# Patient Record
Sex: MALE | Race: WHITE | NOT HISPANIC OR LATINO | Employment: FULL TIME | URBAN - METROPOLITAN AREA
[De-identification: names, ages, dates, MRNs, and addresses within clinical notes are randomized per-mention and may not be internally consistent; named-entity substitution may affect disease eponyms.]

---

## 2018-10-08 ENCOUNTER — APPOINTMENT (OUTPATIENT)
Dept: URGENT CARE | Facility: MEDICAL CENTER | Age: 38
End: 2018-10-08

## 2018-10-15 ENCOUNTER — APPOINTMENT (OUTPATIENT)
Dept: URGENT CARE | Facility: MEDICAL CENTER | Age: 38
End: 2018-10-15
Payer: OTHER MISCELLANEOUS

## 2018-10-15 PROCEDURE — 99213 OFFICE O/P EST LOW 20 MIN: CPT | Performed by: PREVENTIVE MEDICINE

## 2018-10-19 ENCOUNTER — APPOINTMENT (OUTPATIENT)
Dept: URGENT CARE | Facility: MEDICAL CENTER | Age: 38
End: 2018-10-19
Payer: OTHER MISCELLANEOUS

## 2018-10-19 PROCEDURE — 99213 OFFICE O/P EST LOW 20 MIN: CPT | Performed by: FAMILY MEDICINE

## 2018-10-26 ENCOUNTER — APPOINTMENT (OUTPATIENT)
Dept: URGENT CARE | Facility: MEDICAL CENTER | Age: 38
End: 2018-10-26
Payer: OTHER MISCELLANEOUS

## 2018-10-26 PROCEDURE — 99213 OFFICE O/P EST LOW 20 MIN: CPT | Performed by: PREVENTIVE MEDICINE

## 2018-11-02 ENCOUNTER — APPOINTMENT (OUTPATIENT)
Dept: OCCUPATIONAL MEDICINE | Facility: CLINIC | Age: 38
End: 2018-11-02
Payer: OTHER MISCELLANEOUS

## 2018-11-02 PROCEDURE — 99213 OFFICE O/P EST LOW 20 MIN: CPT | Performed by: PREVENTIVE MEDICINE

## 2018-11-05 ENCOUNTER — TRANSCRIBE ORDERS (OUTPATIENT)
Dept: ADMINISTRATIVE | Facility: HOSPITAL | Age: 38
End: 2018-11-05

## 2018-11-05 DIAGNOSIS — M54.5 LOW BACK PAIN, UNSPECIFIED BACK PAIN LATERALITY, UNSPECIFIED CHRONICITY, WITH SCIATICA PRESENCE UNSPECIFIED: Primary | ICD-10-CM

## 2018-11-08 ENCOUNTER — EVALUATION (OUTPATIENT)
Dept: PHYSICAL THERAPY | Facility: MEDICAL CENTER | Age: 38
End: 2018-11-08
Payer: OTHER MISCELLANEOUS

## 2018-11-08 DIAGNOSIS — M54.50 ACUTE BILATERAL LOW BACK PAIN WITHOUT SCIATICA: Primary | ICD-10-CM

## 2018-11-08 DIAGNOSIS — M54.6 ACUTE MIDLINE THORACIC BACK PAIN: ICD-10-CM

## 2018-11-08 PROCEDURE — G8991 OTHER PT/OT GOAL STATUS: HCPCS | Performed by: PHYSICAL THERAPIST

## 2018-11-08 PROCEDURE — 97110 THERAPEUTIC EXERCISES: CPT | Performed by: PHYSICAL THERAPIST

## 2018-11-08 PROCEDURE — G8990 OTHER PT/OT CURRENT STATUS: HCPCS | Performed by: PHYSICAL THERAPIST

## 2018-11-08 PROCEDURE — 97162 PT EVAL MOD COMPLEX 30 MIN: CPT | Performed by: PHYSICAL THERAPIST

## 2018-11-08 PROCEDURE — 97014 ELECTRIC STIMULATION THERAPY: CPT | Performed by: PHYSICAL THERAPIST

## 2018-11-08 RX ORDER — METHOCARBAMOL 750 MG/1
500 TABLET, FILM COATED ORAL EVERY 6 HOURS PRN
COMMUNITY
End: 2018-11-17
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
## 2018-11-12 ENCOUNTER — OFFICE VISIT (OUTPATIENT)
Dept: PHYSICAL THERAPY | Facility: MEDICAL CENTER | Age: 38
End: 2018-11-12
Payer: OTHER MISCELLANEOUS

## 2018-11-12 DIAGNOSIS — M54.6 ACUTE MIDLINE THORACIC BACK PAIN: ICD-10-CM

## 2018-11-12 DIAGNOSIS — M54.50 ACUTE BILATERAL LOW BACK PAIN WITHOUT SCIATICA: Primary | ICD-10-CM

## 2018-11-12 PROCEDURE — 97140 MANUAL THERAPY 1/> REGIONS: CPT | Performed by: PHYSICAL THERAPIST

## 2018-11-12 PROCEDURE — 97014 ELECTRIC STIMULATION THERAPY: CPT | Performed by: PHYSICAL THERAPIST

## 2018-11-12 PROCEDURE — 97110 THERAPEUTIC EXERCISES: CPT | Performed by: PHYSICAL THERAPIST

## 2018-11-12 NOTE — PROGRESS NOTES
Daily Note     Today's date: 2018  Patient name: Avery Choudhury  : 1980  MRN: [id number]  Referring provider: Jaimie Lorenzo MD  Dx:   Encounter Diagnosis     ICD-10-CM    1  Acute bilateral low back pain without sciatica M54 5    2  Acute midline thoracic back pain M54 6        Start Time: 0800  Stop Time: 0852  Total time in clinic (min): 52 minutes    Subjective: Patient reports that he did well with his HEP He reports that he was able to improve the ROM of his lower trunk rotations over the weekend  Patient reports that he had a few hours of total relief following TENS and ice last visit  Objective: See treatment diary below  Precautions: As Tolerated     Daily Treatment Diary      Manual                     Lumbar STM NV  TK 11 min                                                                                                                         Exercise Diary                     PPT 5 sec x 10 5 sec x 15                   Singe knee to chest 3 sec x 10 bilaterally  3 sec x 10 bilaterally                   LTR 10x pain free range  10x pain free range                    DLS: Ball Press   15 x 5 sec hold                    Bridges   2 x 10                    DLS: Marche   NV                    Total gym squat    NV                    Clam shells   NV                    tband rows with ab bracing   NV                    tband extensions with ab vracing   NV                                                                                                                                                                                                                                                                         Modalities                     CP L/S 10' TK  10' TK                   Interferential stim 10'right lumbar spine  10'right lumbar spine                                                  Assessment:  Patient tolerated treatment session well this visit   He had improved tolerance to prone lying as he was able to lie prone with two pillows under hips  Patient also did not report any tenderness to palpation of paraspinals during manual STM this visit  Patient challenged with abdominal activation activities this date as he reported soreness and fatigue in anterior trunk following exercise program  He will continue to benefit from PT in order to decrease lumbar spine pain and facilitate return to function and work activities  Plan: Continue per plan of care

## 2018-11-13 ENCOUNTER — HOSPITAL ENCOUNTER (OUTPATIENT)
Dept: RADIOLOGY | Age: 38
Discharge: HOME | End: 2018-11-13
Payer: OTHER MISCELLANEOUS

## 2018-11-13 DIAGNOSIS — M54.5 LOW BACK PAIN, UNSPECIFIED BACK PAIN LATERALITY, UNSPECIFIED CHRONICITY, WITH SCIATICA PRESENCE UNSPECIFIED: ICD-10-CM

## 2018-11-13 PROCEDURE — 72148 MRI LUMBAR SPINE W/O DYE: CPT

## 2018-11-13 PROCEDURE — 72146 MRI CHEST SPINE W/O DYE: CPT

## 2018-11-15 ENCOUNTER — OFFICE VISIT (OUTPATIENT)
Dept: PHYSICAL THERAPY | Facility: MEDICAL CENTER | Age: 38
End: 2018-11-15
Payer: OTHER MISCELLANEOUS

## 2018-11-15 DIAGNOSIS — M54.50 ACUTE BILATERAL LOW BACK PAIN WITHOUT SCIATICA: Primary | ICD-10-CM

## 2018-11-15 DIAGNOSIS — M54.6 ACUTE MIDLINE THORACIC BACK PAIN: ICD-10-CM

## 2018-11-15 PROCEDURE — 97110 THERAPEUTIC EXERCISES: CPT | Performed by: PHYSICAL THERAPIST

## 2018-11-15 PROCEDURE — 97014 ELECTRIC STIMULATION THERAPY: CPT | Performed by: PHYSICAL THERAPIST

## 2018-11-15 PROCEDURE — 97140 MANUAL THERAPY 1/> REGIONS: CPT | Performed by: PHYSICAL THERAPIST

## 2018-11-15 NOTE — PROGRESS NOTES
Daily Note     Today's date: 11/15/2018  Patient name: Elis Escobedo  : 1980  MRN: [id number]  Referring provider: Bo Douglas MD  Dx:   Encounter Diagnosis     ICD-10-CM    1  Acute bilateral low back pain without sciatica M54 5    2  Acute midline thoracic back pain M54 6        Start Time: 0800  Stop Time: 0903  Total time in clinic (min): 63 minutes    Subjective: Patient states that he was stiff after receiving his MRI  He states that overall his back pain is improving and he is moving better   He reports that he has follow up appointment with referring physician tomorrow (18)      Objective: See treatment diary below  Precautions: As Tolerated     Daily Treatment Diary      Manual  11/8  11/12  11/15                 Lumbar STM NV  TK 11 min  TK 9 min                                                                                                                       Exercise Diary                   PPT 5 sec x 10 5 sec x 15  5 sec x 15                 Singe knee to chest 3 sec x 10 bilaterally  3 sec x 10 bilaterally  3 sec x 10 bilaterally                 LTR 10x pain free range  10x pain free range 10x pain free range                  DLS: Ball Press   15 x 5 sec hold  15 x 5 sec hold                  Bridges   2 x 10  2 x 10                  DLS: Marche   NV  29X                  Total gym squat    NV  Level 17 2 x 10                  Clam shells   NV  NV                  tband rows with ab bracing   NV  2 x 10 RTB                  tband extensions with ab vracing   NV  2 x 10 RTB                                                                                                                                                                                                                                                                       Modalities  11/8  11/12  11/15                 CP L/S 10' TK  10' TK  10' TK                 Interferential stim 10'right lumbar spine  10'right lumbar spine   10'right lumbar spine                                               Assessment: Patient tolerated treatment session well  Improved abdominal activation during lumbar stabilization activities this date  Patient also had a good tolerance to performing and maintain core activation during UE use this date  E will continue to benefit from PT in order to improve core strength, decrease lumbar pain, and allow for safe return to to work  Plan: Continue per plan of care

## 2018-11-16 ENCOUNTER — APPOINTMENT (OUTPATIENT)
Dept: OCCUPATIONAL MEDICINE | Facility: CLINIC | Age: 38
End: 2018-11-16
Payer: OTHER MISCELLANEOUS

## 2018-11-16 PROCEDURE — 99213 OFFICE O/P EST LOW 20 MIN: CPT

## 2018-11-17 VITALS
SYSTOLIC BLOOD PRESSURE: 148 MMHG | BODY MASS INDEX: 28.18 KG/M2 | HEIGHT: 70 IN | HEART RATE: 85 BPM | DIASTOLIC BLOOD PRESSURE: 104 MMHG | WEIGHT: 196.8 LBS

## 2018-11-17 DIAGNOSIS — M54.6 CHRONIC THORACIC BACK PAIN, UNSPECIFIED BACK PAIN LATERALITY: Primary | ICD-10-CM

## 2018-11-17 DIAGNOSIS — G89.29 CHRONIC THORACIC BACK PAIN, UNSPECIFIED BACK PAIN LATERALITY: Primary | ICD-10-CM

## 2018-11-17 DIAGNOSIS — M54.50 ACUTE BILATERAL LOW BACK PAIN WITHOUT SCIATICA: ICD-10-CM

## 2018-11-17 PROCEDURE — 99203 OFFICE O/P NEW LOW 30 MIN: CPT | Performed by: FAMILY MEDICINE

## 2018-11-17 RX ORDER — IBUPROFEN 800 MG/1
TABLET ORAL EVERY 6 HOURS PRN
COMMUNITY
End: 2018-12-05 | Stop reason: SDUPTHER
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
## 2018-11-19 ENCOUNTER — OFFICE VISIT (OUTPATIENT)
Dept: PHYSICAL THERAPY | Facility: MEDICAL CENTER | Age: 38
End: 2018-11-19
Payer: OTHER MISCELLANEOUS

## 2018-11-19 ENCOUNTER — TELEPHONE (OUTPATIENT)
Dept: PAIN MEDICINE | Facility: MEDICAL CENTER | Age: 38
End: 2018-11-19

## 2018-11-19 ENCOUNTER — NURSE TRIAGE (OUTPATIENT)
Dept: PHYSICAL THERAPY | Facility: OTHER | Age: 38
End: 2018-11-19

## 2018-11-19 DIAGNOSIS — M54.6 ACUTE MIDLINE THORACIC BACK PAIN: ICD-10-CM

## 2018-11-19 DIAGNOSIS — M54.50 ACUTE BILATERAL LOW BACK PAIN WITHOUT SCIATICA: Primary | ICD-10-CM

## 2018-11-19 PROCEDURE — 97110 THERAPEUTIC EXERCISES: CPT | Performed by: PHYSICAL THERAPIST

## 2018-11-19 PROCEDURE — 97140 MANUAL THERAPY 1/> REGIONS: CPT | Performed by: PHYSICAL THERAPIST

## 2018-11-19 PROCEDURE — 97014 ELECTRIC STIMULATION THERAPY: CPT | Performed by: PHYSICAL THERAPIST

## 2018-11-19 NOTE — TELEPHONE ENCOUNTER
Pt being referred for low back pain    No prior pain mgt  MRI done @   W/C info below          Nithin 34- 10/5/2018  [address] [facility]  Address: [facility] [address] [address]  Claim: [id number]  Adjustor: Alexys Allen  Phone # [phone]      W/C active and open
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
## 2018-11-19 NOTE — TELEPHONE ENCOUNTER
Pt states this is a work comp case  Pt is actively working with his [facility] and [employer] regarding appts       RN made pt aware that since this is workman's comp, there is no eligibility for direct access, nor could any referrals come from this program

## 2018-11-20 NOTE — TELEPHONE ENCOUNTER
Reason for Disposition   Is this related to a work injury?     Protocols used: SUZANNE MALL[facility] COMPREHENSIVE SPINE PROGRAM PROTOCOL

## 2018-11-21 ENCOUNTER — OFFICE VISIT (OUTPATIENT)
Dept: PHYSICAL THERAPY | Facility: MEDICAL CENTER | Age: 38
End: 2018-11-21
Payer: OTHER MISCELLANEOUS

## 2018-11-21 DIAGNOSIS — M54.50 ACUTE BILATERAL LOW BACK PAIN WITHOUT SCIATICA: Primary | ICD-10-CM

## 2018-11-21 DIAGNOSIS — M54.6 ACUTE MIDLINE THORACIC BACK PAIN: ICD-10-CM

## 2018-11-21 PROCEDURE — 97014 ELECTRIC STIMULATION THERAPY: CPT

## 2018-11-21 PROCEDURE — 97110 THERAPEUTIC EXERCISES: CPT

## 2018-11-21 PROCEDURE — 97140 MANUAL THERAPY 1/> REGIONS: CPT

## 2018-11-21 NOTE — PROGRESS NOTES
Daily Note     Today's date: 2018  Patient name: Sloan Naylor  : 1980  MRN: [id number]  Referring provider: Beth Perez MD  Dx:   Encounter Diagnosis     ICD-10-CM    1  Acute bilateral low back pain without sciatica M54 5    2  Acute midline thoracic back pain M54 6                8:30- 9:30   Subjective: Pt states he continues to have pain along the right side of his low back and in toward the middle of his low back  Pt states he has been doing his exercise at home which seem to help decrease pain       Objective: See treatment diary below  Precautions: T11 compression Fx     Daily Treatment Diary      Manual  11/8  11/12  11/15  11/19  11/21             Lumbar STM NV  TK 11 min  TK 9 min  TK 10 min    CF10                                                                                                                   Exercise Diary                Ab bracing 5 sec x 10 5 sec x 15  5 sec x 15  5 sec x 15  15 x 5"              Singe knee to chest 3 sec x 10 bilaterally  3 sec x 10 bilaterally  3 sec x 10 bilaterally  NP  HEP             LTR 10x pain free range  10x pain free range 10x pain free range  NP  10x               DLS: Ball Press   15 x 5 sec hold  15 x 5 sec hold  15 x 5 sec hold 15 x5"               Bridges   2 x 10  2 x 10   2 x 10  2 x10               DLS: Marche   NV  61X   10x  10x              Total gym squat    NV  Level 17 2 x 10  Level 17 2 x 10  lv 17              Clam shells   NV  NV 20x 20x              tband rows with ab bracing   NV  2 x 10 RTB  2 x 10 RTB  2 x10   RTB              tband extensions with ab vracing   NV  2 x 10 RTB  2 x 10 RTB  2 x10 RTB                                                                                                                                                                                                                                                                   Modalities  11/8  11/12  11/15  11/19  11/21             CP L/S 10' TK  10' TK  10' TK  10' TK  10' CF             Interferential stim 10'right lumbar spine  10'right lumbar spine   10'right lumbar spine  10'right lumbar spine  10' R l-spine                                           Assessment: Pt progressed through exercises well with good core contraction seen with table exercises  Pt demonstrates fair  Scapular control seen with tband exercises, pt was able to self correct after verbal cuing  Pt demonstrates decreased pain at end of session  Plan: Continue per plan of care

## 2018-11-26 ENCOUNTER — OFFICE VISIT (OUTPATIENT)
Dept: PHYSICAL THERAPY | Facility: MEDICAL CENTER | Age: 38
End: 2018-11-26
Payer: OTHER MISCELLANEOUS

## 2018-11-26 DIAGNOSIS — M54.50 ACUTE BILATERAL LOW BACK PAIN WITHOUT SCIATICA: Primary | ICD-10-CM

## 2018-11-26 DIAGNOSIS — M54.6 ACUTE MIDLINE THORACIC BACK PAIN: ICD-10-CM

## 2018-11-26 PROCEDURE — 97112 NEUROMUSCULAR REEDUCATION: CPT | Performed by: PHYSICAL THERAPIST

## 2018-11-26 PROCEDURE — 97014 ELECTRIC STIMULATION THERAPY: CPT | Performed by: PHYSICAL THERAPIST

## 2018-11-26 PROCEDURE — 97140 MANUAL THERAPY 1/> REGIONS: CPT | Performed by: PHYSICAL THERAPIST

## 2018-11-26 NOTE — PROGRESS NOTES
Daily Note     Today's date: 2018  Patient name: Kenn Daley  : 1980  MRN: [id number]  Referring provider: Brittany Zapata MD  Dx:   Encounter Diagnosis     ICD-10-CM    1  Acute bilateral low back pain without sciatica M54 5    2  Acute midline thoracic back pain M54 6        Start Time: 0800  Stop Time: 0903  Total time in clinic (min): 63 minutes    Subjective: Patient reports pain level of 5/10 in lower thoracic/upper lumbar spine this date  He states that the pain is not exacerbated by any activity  Patient reports that his pain is now central in nature and does not radiate to right side like is used to  He states that the exercises and message have helped to reduce the muscle pain in his back        Objective: See treatment diary below  Precautions: T11 compression Fx     Daily Treatment Diary      Manual  11/8  11/12  11/15  11/19  11/21  11/26           Lumbar STM NV  TK 11 min  TK 9 min  TK 10 min    CF10  TK 10 min                                                                                                                 Exercise Diary              Ab bracing 5 sec x 10 5 sec x 15  5 sec x 15  5 sec x 15  15 x 5"   15 x 5"             DLS: Ball Press   15 x 5 sec hold  15 x 5 sec hold  15 x 5 sec hold 15 x5"   15 x5"            Bridges   2 x 10  2 x 10   2 x 10  2 x10   2 x10            DLS: Marche   NV  19Z   10x  10x   10x            Total gym squat    NV  Level 17 2 x 10  Level 17 2 x 10  lv 17  Level 17 2 x 10            Clam shells   NV  NV 20x 20x  20x RTBB             tband rows with ab bracing   NV  2 x 10 RTB  2 x 10 RTB  2 x10   RTB   2 x10   GTB            tband extensions with ab vracing   NV  2 x 10 RTB  2 x 10 RTB  2 x10 RTB    2 x10   GTB            Pallof Press             2 x10   GTB                                                                                                                                                                                                                                         Modalities  11/8  11/12  11/15  11/19  11/21  11/26           CP L/S 10' TK  10' TK  10' TK  10' TK  10' CF  10' TK           Interferential stim 10'right lumbar spine  10'right lumbar spine   10'right lumbar spine  10'right lumbar spine  10' R l-spine   10'right lumbar spine                                          Assessment: Patient tolerated addition of resistance to gluteal and scapular exercises well without complaints of pain  He also tolerated addition of pallof press for abdominal activation/strength without reported exacerbation of symptoms  Patient will continue to benefit from PT in order to decreas lower thoracic spine pain and continue to increase tolerance to activity  Plan: Continue per plan of care

## 2018-11-29 ENCOUNTER — OFFICE VISIT (OUTPATIENT)
Dept: PHYSICAL THERAPY | Facility: MEDICAL CENTER | Age: 38
End: 2018-11-29
Payer: OTHER MISCELLANEOUS

## 2018-11-29 DIAGNOSIS — M54.6 ACUTE MIDLINE THORACIC BACK PAIN: ICD-10-CM

## 2018-11-29 DIAGNOSIS — M54.50 ACUTE BILATERAL LOW BACK PAIN WITHOUT SCIATICA: Primary | ICD-10-CM

## 2018-11-29 PROCEDURE — 97112 NEUROMUSCULAR REEDUCATION: CPT | Performed by: PHYSICAL THERAPIST

## 2018-11-29 PROCEDURE — 97014 ELECTRIC STIMULATION THERAPY: CPT | Performed by: PHYSICAL THERAPIST

## 2018-11-29 PROCEDURE — 97140 MANUAL THERAPY 1/> REGIONS: CPT | Performed by: PHYSICAL THERAPIST

## 2018-11-29 NOTE — PROGRESS NOTES
Daily Note     Today's date: 2018  Patient name: Frederick Baker  : 1980  MRN: [id number]  Referring provider: Mikey Middleton MD  Dx:   Encounter Diagnosis     ICD-10-CM    1  Acute bilateral low back pain without sciatica M54 5    2  Acute midline thoracic back pain M54 6        Start Time: 0800  Stop Time: 0907  Total time in clinic (min): 67 minutes    Subjective: Patient states that he continues to have pain of 5/10 in his lower thoracic spine  He reports that this has not been radiating to the right as it once did  Patient reports that he also has noticed improved tolerance to activity without exacerbation of symptoms        Ont diary belowbjective: See treatment diary below  Precautions: T11 compression Fx     Daily Treatment Diary      Manual  11/8  11/12  11/15  11/19  11/21  11/26  11/28         Lumbar STM NV  TK 11 min  TK 9 min  TK 10 min    CF10  TK 10 min  TK 10 min                                                                                                               Exercise Diary           Ab bracing 5 sec x 10 5 sec x 15  5 sec x 15  5 sec x 15  15 x 5"   15 x 5"   15 x 5"           DLS: Ball Press   15 x 5 sec hold  15 x 5 sec hold  15 x 5 sec hold 15 x5"   15 x5"  15 x 5"           Bridges   2 x 10  2 x 10   2 x 10  2 x10   2 x10  2 x10          DLS: Marche   NV  12O   10x  10x   10x   10x          Total gym squat    NV  Level 17 2 x 10  Level 17 2 x 10  lv 17  Level 17 2 x 10   Level 17 2 x 10          Clam shells   NV  NV 20x 20x  20x RTBB   20x RTBB          tband rows with ab bracing   NV  2 x 10 RTB  2 x 10 RTB  2 x10   RTB   2 x10   GTB    2 x10   GTB          tband extensions with ab vracing   NV  2 x 10 RTB  2 x 10 RTB  2 x10 RTB    2 x10   GTB   2 x10   GTB          Pallof Press             2 x10   GTB   2 x10   GTB                                                                                                                                                                                                                                       Modalities  11/8  11/12  11/15  11/19  11/21  11/26  11/28         CP L/S 10' TK  10' TK  10' TK  10' TK  10' CF  10' TK  10' TK         Interferential stim 10'right lumbar spine  10'right lumbar spine   10'right lumbar spine  10'right lumbar spine  10' R l-spine   10'right lumbar spine   10'right lumbar spine                                        Assessment:  Patient reports no change in pain following STM this date  He is able to perform scapular and abdominal exercises without increase in pain  He will continue to benefit from PT in order to improve tolerance to functional activity  Plan: Continue per plan of care

## 2018-12-03 ENCOUNTER — OFFICE VISIT (OUTPATIENT)
Dept: PHYSICAL THERAPY | Facility: MEDICAL CENTER | Age: 38
End: 2018-12-03
Payer: OTHER MISCELLANEOUS

## 2018-12-03 DIAGNOSIS — M54.6 ACUTE MIDLINE THORACIC BACK PAIN: ICD-10-CM

## 2018-12-03 DIAGNOSIS — M54.50 ACUTE BILATERAL LOW BACK PAIN WITHOUT SCIATICA: Primary | ICD-10-CM

## 2018-12-03 PROCEDURE — 97112 NEUROMUSCULAR REEDUCATION: CPT | Performed by: PHYSICAL THERAPIST

## 2018-12-03 PROCEDURE — 97014 ELECTRIC STIMULATION THERAPY: CPT | Performed by: PHYSICAL THERAPIST

## 2018-12-03 PROCEDURE — 97140 MANUAL THERAPY 1/> REGIONS: CPT | Performed by: PHYSICAL THERAPIST

## 2018-12-03 NOTE — PROGRESS NOTES
PT Re-Evaluation     Today's date: 12/3/2018  Patient name: Elis Escobedo  : 1980  MRN: [id number]  Referring provider: Bo Douglas MD  Dx:   Encounter Diagnosis     ICD-10-CM    1  Acute bilateral low back pain without sciatica M54 5    2  Acute midline thoracic back pain M54 6                   Assessment  Assessment details: Patient has demonstrated improved volitional abdominal control and decrease paraspinal hypertonicity allowing for an overall decrease in pain  This has allowed for improved tolerance to sitting, tanding, and ambulation  patient continues to have pain without prolonged activity  He will continue to benefit from PT in order to decrease lumbar/thoracic pain, improve core strength, and improve tolerance to activity        Impairments: abnormal or restricted ROM, activity intolerance, lacks appropriate home exercise program and pain with function  Understanding of Dx/Px/POC: good   Prognosis: fair    Goals  Short term goals:   1) Patient will demonstrate decrease pain by 20-50% in 4 weeks-ongoing  2) Patient will demonstrate no raheel's sign when returning from lumbar/trunk flexion  in 4 weeksongoing  3) Patient will demonstrate improved lumbar ROM by 25% in 4 weeks-ongoing  4) Patient will demonstrate TA contraction of good in 4 week-ongoing  Long term goals:  1) Patient will demonstrate ability to ambulate > 2 hours without pain in 6 weeks-ongoing  2) Patient will demonstrate ability to sit from > 1 hours without lumbar pain in 6 wee-ongoingks  3) Patient will demonstrate ability to carry laundry without lumbar pain in 6 weeks-ongoing  4) Patient will demonstrate independence with HEP in  6 weeks-ongoing          Plan  Patient would benefit from: skilled physical therapy  Referral necessary: No  Planned modality interventions: electrical stimulation/Russian stimulation, TENS, thermotherapy: hydrocollator packs, cryotherapy and unattended electrical stimulation  Planned therapy interventions: joint mobilization, manual therapy, massage, motor coordination training, muscle pump exercises, neuromuscular re-education, patient education, postural training, strengthening, stretching, therapeutic activities, therapeutic exercise, therapeutic training, graded motor, graded exercise, graded activity, gait training, functional ROM exercises, flexibility, activity modification, abdominal trunk stabilization and body mechanics training  Frequency: 2x week  Duration in weeks: 6  Treatment plan discussed with: patient        Subjective Evaluation    History of Present Illness  Mechanism of injury: Patient reports that he has noticed an improvement since starting physical therapy  He has noticed increased ease with moving, however states that he continues to avoid bending of his trunk and twisting of his trunk  Patient reports that he was able to sit for an hour and a half prior to increased pain  Patient reports that overall he has noticed a decrease in pain  Patient denies numbness and tingling into legs  Patient has appointment with pain management physician on 2018  He reports that he reports that he has returned to 60% prior level of functioning  Pain  Current pain ratin  At best pain ratin  At worst pain ratin  Location: P1) Lower thoracic to right lumbar spine- constant, nonvarying, deep " burning"    Social Support  Lives in: multiple-level home  Lives with: spouse and young children    Employment status: working (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)  Exercise history: Patient 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[employer], carrying laundry down stairs  Patient reports that he likes to ride his motor cycle          Objective     Special Questions  Positive for history of trauma  Negative for bladder dysfunction, bowel dysfunction, saddle (S4) numbness and history of cancer    Additional Special Questions  Patient denies unexplained weight loss       Static Posture Comments  Increase paraspinal muscle tone    Palpation   Left   No palpable tenderness to the lumbar paraspinals  Right   No palpable tenderness to the lumbar paraspinals and quadratus lumborum  Active Range of Motion     Lumbar   Flexion: Active lumbar flexion: 75% pain coming back up, raheel's sign:    Extension: Active lumbar extension: 25% Pain    Left lateral flexion: Active left lumbar lateral flexion: 75% NE  Right lateral flexion: Active right lumbar lateral flexion: 50% pain on right side    Left rotation: Active left lumbar rotation: 75% NE  Right rotation: Active right lumbar rotation: 50%      Additional Active Range of Motion Details  (+) raheel's sign returning from flexion  (+) Prone instability test L3  SLR 93 right  SLR 91 left    Muscle Activation     Additional Muscle Activation Details  Poor volitional TA activation  Tests     Lumbar     Left   Negative crossed SLR and passive SLR  Right   Negative crossed SLR and passive SLR  Additional Tests Details  Spring testing L5- decrease pain  Spring testing L4- decrease pain  Spring testing L3- increase pain  Spring test L2- NE  Spring test L1- NE    Ambulation     Comments   Decreased lumbar rotation, decreased arm swing            Precautions: As Tolerated  Precautions: T11 compression Fx     Daily Treatment Diary      Manual  11/8  11/12  11/15  11/19  11/21  11/26  11/28         Lumbar STM NV  TK 11 min  TK 9 min  TK 10 min    CF10  TK 10 min  TK 10 min                                                                                                               Exercise Diary  11/8  11/12  11/18  11/19  11/21   11/26   11/28  12/3       Ab bracing 5 sec x 10 5 sec x 15  5 sec x 15  5 sec x 15  15 x 5"   15 x 5"   15 x 5"   15 x 5        DLS: Ball Press   15 x 5 sec hold  15 x 5 sec hold  15 x 5 sec hold 15 x5"   15 x5"  15 x 5"   15 x 5        Bridges   2 x 10  2 x 10   2 x 10  2 x10   2 x10  2 x10   2 x10        DLS: [place]   NV  10x   10x  10x   10x   10x  10x        Total gym squat    NV  Level 17 2 x 10  Level 17 2 x 10  lv 17  Level 17 2 x 10   Level 17 2 x 10    Level 17 2 x 10        Clam shells   NV  NV 20x 20x  20x RTBB   20x RTBB  20x RTBB        tband rows with ab bracing   NV  2 x 10 RTB  2 x 10 RTB  2 x10   RTB   2 x10   GTB    2 x10   GTB  2 x10   GTB        tband extensions with ab vracing   NV  2 x 10 RTB  2 x 10 RTB  2 x10 RTB    2 x10   GTB   2 x10   GTB    2 x10   GTB        Pallof Press             2 x10   GTB   2 x10   GTB  2 x10   GTB        Treadmill walk               6 min self pace                                                                                                                                                                                                             Modalities  11/8  11/12  11/15  11/19  11/21  11/26  11/28  12/3       CP L/S 10' TK  10' TK  10' TK  10' TK  10' CF  10' TK  10' TK     10' TK     10'right lumbar spine          Interferential stim 10'right lumbar spine  10'right lumbar spine   10'right lumbar spine  10'right lumbar spine  10' R l-spine   10'right lumbar spine   10'right lumbar spine

## 2018-12-05 ENCOUNTER — CONSULT (OUTPATIENT)
Dept: PAIN MEDICINE | Facility: CLINIC | Age: 38
End: 2018-12-05
Payer: OTHER MISCELLANEOUS

## 2018-12-05 VITALS
HEART RATE: 74 BPM | RESPIRATION RATE: 18 BRPM | HEIGHT: 70 IN | SYSTOLIC BLOOD PRESSURE: 122 MMHG | DIASTOLIC BLOOD PRESSURE: 76 MMHG | BODY MASS INDEX: 28.35 KG/M2 | WEIGHT: 198 LBS

## 2018-12-05 DIAGNOSIS — M54.6 CHRONIC THORACIC BACK PAIN, UNSPECIFIED BACK PAIN LATERALITY: Primary | ICD-10-CM

## 2018-12-05 DIAGNOSIS — S22.000A CLOSED COMPRESSION FRACTURE OF THORACIC VERTEBRA, INITIAL ENCOUNTER (HCC): ICD-10-CM

## 2018-12-05 DIAGNOSIS — M79.18 MYOFASCIAL PAIN SYNDROME: ICD-10-CM

## 2018-12-05 DIAGNOSIS — G89.29 CHRONIC THORACIC BACK PAIN, UNSPECIFIED BACK PAIN LATERALITY: Primary | ICD-10-CM

## 2018-12-05 PROCEDURE — 99244 OFF/OP CNSLTJ NEW/EST MOD 40: CPT | Performed by: ANESTHESIOLOGY

## 2018-12-05 RX ORDER — IBUPROFEN 800 MG/1
800 TABLET ORAL EVERY 8 HOURS PRN
Qty: 90 TABLET | Refills: 0 | Status: SHIPPED | OUTPATIENT
Start: 2018-12-05 | End: 2019-01-16
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
## 2018-12-06 ENCOUNTER — OFFICE VISIT (OUTPATIENT)
Dept: PHYSICAL THERAPY | Facility: MEDICAL CENTER | Age: 38
End: 2018-12-06
Payer: OTHER MISCELLANEOUS

## 2018-12-06 DIAGNOSIS — G89.29 CHRONIC THORACIC BACK PAIN, UNSPECIFIED BACK PAIN LATERALITY: ICD-10-CM

## 2018-12-06 DIAGNOSIS — M54.6 ACUTE MIDLINE THORACIC BACK PAIN: ICD-10-CM

## 2018-12-06 DIAGNOSIS — M54.6 CHRONIC THORACIC BACK PAIN, UNSPECIFIED BACK PAIN LATERALITY: ICD-10-CM

## 2018-12-06 DIAGNOSIS — M54.50 ACUTE BILATERAL LOW BACK PAIN WITHOUT SCIATICA: Primary | ICD-10-CM

## 2018-12-06 PROCEDURE — 97140 MANUAL THERAPY 1/> REGIONS: CPT

## 2018-12-06 PROCEDURE — 97014 ELECTRIC STIMULATION THERAPY: CPT

## 2018-12-06 PROCEDURE — 97112 NEUROMUSCULAR REEDUCATION: CPT

## 2018-12-06 NOTE — PROGRESS NOTES
Daily Note     Today's date: 2018  Patient name: Annita Becker  : 1980  MRN: [id number]  Referring provider: Jose Ritter MD  Dx:   Encounter Diagnosis     ICD-10-CM    1  Acute bilateral low back pain without sciatica M54 5    2  Acute midline thoracic back pain M54 6                   Subjective: Pt reports that he has been doing much better since coming to therapy  Notes that he has seen a great improvement with therapy thus far stating that his pain is currently a 5/10 in his R low back        Objective: See treatment diary below  Precautions: As Tolerated  Precautions: T11 compression Fx     Daily Treatment Diary      Manual  11/8  11/12  11/15  11/19  11/21  11/26  11/28  12/3  12/6     Lumbar STM NV  TK 11 min  TK 9 min  TK 10 min    CF10  TK 10 min  TK 10 min  TK 10 min  MM 10 min                                                                                                           Exercise Diary  11/8  11/12  11/18  11/19  11/21   11/26   11/28  12/3  12/6     Ab bracing 5 sec x 10 5 sec x 15  5 sec x 15  5 sec x 15  15 x 5"   15 x 5"   15 x 5"   15 x 5  15 x 5"      DLS: Ball Press   15 x 5 sec hold  15 x 5 sec hold  15 x 5 sec hold 15 x5"   15 x5"  15 x 5"   15 x 5  15 x5"      Bridges   2 x 10  2 x 10   2 x 10  2 x10   2 x10  2 x10   2 x10  2x10      DLS: Marche   NV  15N   10x  10x   10x   10x  10x  10x       Total gym squat    NV  Level 17 2 x 10  Level 17 2 x 10  lv 17  Level 17 2 x 10   Level 17 2 x 10    Level 17 2 x 10  LVL 17 2x10      Clam shells   NV  NV 20x 20x  20x RTBB   20x RTBB  20x RTBB  25x RTB      tband rows with ab bracing   NV  2 x 10 RTB  2 x 10 RTB  2 x10   RTB   2 x10   GTB    2 x10   GTB  2 x10   GTB  2x10 BTB      tband extensions with ab vracing   NV  2 x 10 RTB  2 x 10 RTB  2 x10 RTB    2 x10   GTB   2 x10   GTB    2 x10   GTB  2x10 BTB      Pallof Press             2 x10   GTB   2 x10   GTB  2 x10   GTB  2x10 BTB      Treadmill walk               6 min self pace  6'                                                                                                                                                                                                           Modalities  11/8  11/12  11/15  11/19  11/21  11/26  11/28  12/3  12/6     CP L/S 10' TK  10' TK  10' TK  10' TK  10' CF  10' TK  10' TK     10' TK     10'right lumbar spine     10' MM     Interferential stim 10'right lumbar spine  10'right lumbar spine   10'right lumbar spine  10'right lumbar spine  10' R l-spine   10'right lumbar spine   10'right lumbar spine    10' R L- spine                                     Assessment: Tolerated treatment well  Minimal tightness noted in R side lumbar paraspinals with STM  He was able to progress TE as charted with minimal difficulty noted  Patient demonstrated fatigue post treatment, exhibited good technique with therapeutic exercises and would benefit from continued PT      Plan: Continue per plan of care

## 2018-12-10 ENCOUNTER — OFFICE VISIT (OUTPATIENT)
Dept: PHYSICAL THERAPY | Facility: MEDICAL CENTER | Age: 38
End: 2018-12-10
Payer: OTHER MISCELLANEOUS

## 2018-12-10 DIAGNOSIS — G89.29 CHRONIC THORACIC BACK PAIN, UNSPECIFIED BACK PAIN LATERALITY: ICD-10-CM

## 2018-12-10 DIAGNOSIS — M54.50 ACUTE BILATERAL LOW BACK PAIN WITHOUT SCIATICA: Primary | ICD-10-CM

## 2018-12-10 DIAGNOSIS — M54.6 CHRONIC THORACIC BACK PAIN, UNSPECIFIED BACK PAIN LATERALITY: ICD-10-CM

## 2018-12-10 DIAGNOSIS — M54.6 ACUTE MIDLINE THORACIC BACK PAIN: ICD-10-CM

## 2018-12-10 PROCEDURE — 97014 ELECTRIC STIMULATION THERAPY: CPT

## 2018-12-10 PROCEDURE — 97112 NEUROMUSCULAR REEDUCATION: CPT

## 2018-12-10 PROCEDURE — 97140 MANUAL THERAPY 1/> REGIONS: CPT

## 2018-12-10 PROCEDURE — 97110 THERAPEUTIC EXERCISES: CPT

## 2018-12-10 PROCEDURE — 97010 HOT OR COLD PACKS THERAPY: CPT

## 2018-12-10 NOTE — PROGRESS NOTES
Daily Note     Today's date: 12/10/2018  Patient name: Sloan Naylor  : 1980  MRN: [id number]  Referring provider: Beth Perez MD  Dx:   Encounter Diagnosis     ICD-10-CM    1  Acute bilateral low back pain without sciatica M54 5    2  Acute midline thoracic back pain M54 6    3  Chronic thoracic back pain, unspecified back pain laterality M54 6     G89 29                   Subjective: Pt reports LB has been feeling better         Objective: See treatment diary below  Precautions: As Tolerated  Precautions: T11 compression Fx     Daily Treatment Diary      Manual  11/8  11/12  11/15  11/19  11/21  11/26  11/28  12/3  12/6  12/10   Lumbar STM NV  TK 11 min  TK 9 min  TK 10 min    CF10  TK 10 min  TK 10 min  TK 10 min  MM 10 min  10 min                                                                                                         Exercise Diary  11/8  11/12  11/18  11/19  11/21   11/26   11/28  12/3  12/6  12/10   Ab bracing 5 sec x 10 5 sec x 15  5 sec x 15  5 sec x 15  15 x 5"   15 x 5"   15 x 5"   15 x 5  15 x 5"  5"U04    DLS: Ball Press   15 x 5 sec hold  15 x 5 sec hold  15 x 5 sec hold 15 x5"   15 x5"  15 x 5"   15 x 5  15 x5"      Bridges   2 x 10  2 x 10   2 x 10  2 x10   2 x10  2 x10   2 x10  2x10  2X10    DLS: Marche   NV  42L   10x  10x   10x   10x  10x  10x   10x    Total gym squat    NV  Level 17 2 x 10  Level 17 2 x 10  lv 17  Level 17 2 x 10   Level 17 2 x 10    Level 17 2 x 10  LVL 17 2x10  LVL 20 x210    Clam shells   NV  NV 20x 20x  20x RTBB   20x RTBB  20x RTBB  25x RTB  rtb X25    tband rows with ab bracing   NV  2 x 10 RTB  2 x 10 RTB  2 x10   RTB   2 x10   GTB    2 x10   GTB  2 x10   GTB  2x10 BTB  2xTB    tband extensions with ab vracing   NV  2 x 10 RTB  2 x 10 RTB  2 x10 RTB    2 x10   GTB   2 x10   GTB    2 x10   GTB  2x10 BTB  BTB 2x10    Pallof Press             2 x10   GTB   2 x10   GTB  2 x10   GTB  2x10 BTB  BTB 2x10    Treadmill walk               6 min self pace  6'  6 min                                                                                                                                                                                                         Modalities  11/8  11/12  11/15  11/19  11/21  11/26  11/28  12/3  12/6  12/10   CP L/S 10' TK  10' TK  10' TK  10' TK  10' CF  10' TK  10' TK     10' TK     10'right lumbar spine     10' MM  10'   Interferential stim 10'right lumbar spine  10'right lumbar spine   10'right lumbar spine  10'right lumbar spine  10' R l-spine   10'right lumbar spine   10'right lumbar spine    10' R L- spine  10' R L spine                                   Assessment: Tolerated treatment well  Moderate tightness noted in lower TS and upper LS on R side  Patient demonstrated fatigue post treatment, exhibited good technique with therapeutic exercises and would benefit from continued PT      Plan: Continue per plan of care

## 2018-12-13 ENCOUNTER — OFFICE VISIT (OUTPATIENT)
Dept: PHYSICAL THERAPY | Facility: MEDICAL CENTER | Age: 38
End: 2018-12-13
Payer: OTHER MISCELLANEOUS

## 2018-12-13 DIAGNOSIS — M54.6 ACUTE MIDLINE THORACIC BACK PAIN: ICD-10-CM

## 2018-12-13 DIAGNOSIS — M54.50 ACUTE BILATERAL LOW BACK PAIN WITHOUT SCIATICA: Primary | ICD-10-CM

## 2018-12-13 PROCEDURE — 97140 MANUAL THERAPY 1/> REGIONS: CPT | Performed by: PHYSICAL THERAPIST

## 2018-12-13 PROCEDURE — 97110 THERAPEUTIC EXERCISES: CPT | Performed by: PHYSICAL THERAPIST

## 2018-12-13 PROCEDURE — 97014 ELECTRIC STIMULATION THERAPY: CPT | Performed by: PHYSICAL THERAPIST

## 2018-12-13 NOTE — PROGRESS NOTES
Daily Note     Today's date: 2018  Patient name: Sixto June  : 1980  MRN: [id number]  Referring provider: Khadar Johnston MD  Dx:   Encounter Diagnosis     ICD-10-CM    1  Acute bilateral low back pain without sciatica M54 5    2  Acute midline thoracic back pain M54 6        Start Time: 1030  Stop Time: 1127  Total time in clinic (min): 57 minutes    Subjective: Patient reports that overall his back pain is feeling better  He states that he is having central midline lower thoracic/upper lumbar pain of 4/10 this date        Objective: See treatment diary below  Precautions: As Tolerated  Precautions: T11 compression Fx     Daily Treatment Diary      Manual  12/13  11/12  11/15  11/19  11/21  11/26  11/28  12/3  12/6  12/10   Lumbar STM TK 9 min  TK 11 min  TK 9 min  TK 10 min    CF10  TK 10 min  TK 10 min  TK 10 min  MM 10 min  10 min                                                                                                         Exercise Diary  11/8  11/12  11/18  11/19  11/21   11/26   11/28  12/3  12/6  12/10   Ab bracing 5 sec x 10 5 sec x 15  5 sec x 15  5 sec x 15  15 x 5"   15 x 5"   15 x 5"   15 x 5  15 x 5"  3"A21    DLS: Ball Press  97 x 5 sec hold 15 x 5 sec hold  15 x 5 sec hold  15 x 5 sec hold 15 x5"   15 x5"  15 x 5"   15 x 5  15 x5"      Bridges  2 x 10 2 x 10  2 x 10   2 x 10  2 x10   2 x10  2 x10   2 x10  2x10  2X10    DLS: [address] [place] [address]  18X NV  23N   10x  10x   10x   10x  10x  10x   10x    Total gym squat   Level 17 2 x 10 NV  Level 17 2 x 10  Level 17 2 x 10  lv 17  Level 17 2 x 10   Level 17 2 x 10    Level 17 2 x 10  LVL 17 2x10  LVL 20 x210    Clam shells  25x GTB NV  NV 20x 20x  20x RTBB   20x RTBB  20x RTBB  25x RTB  rtb X25    tband rows with ab bracing   2x10 BTB NV  2 x 10 RTB  2 x 10 RTB  2 x10   RTB   2 x10   GTB    2 x10   GTB  2 x10   GTB  2x10 BTB  2xTB    tband extensions with ab vracing   2x10 BTB NV  2 x 10 RTB  2 x 10 RTB  2 x10 RTB    2 x10   GTB   2 x10   GTB    2 x10 GTB  2x10 BTB  BTB 2x10    Pallof Press   2x10 BTB           2 x10   GTB   2 x10   GTB  2 x10   GTB  2x10 BTB  BTB 2x10    Treadmill walk  6 min self pace             6 min self pace  6'  6 min    Step up 6 in 2 x 10                      hip abd/extension GTB 2 x 10                                                                                                                                                                           Modalities  12/13  11/12  11/15  11/19  11/21  11/26  11/28  12/3  12/6  12/10   CP L/S 10' TK  10' TK  10' TK  10' TK  10' CF  10' TK  10' TK     10' TK     10'right lumbar spine     10' MM  10'   Interferential stim 10'right lumbar spine  10'right lumbar spine   10'right lumbar spine  10'right lumbar spine  10' R l-spine   10'right lumbar spine   10'right lumbar spine    10' R L- spine  10' R L spine                                 Assessment: Patient tolerated progression of gluteal strengthening this date with step up and standing hip abduction and extension activity this date  He continues to report reduction in pain following IFC and ice following treatment  He will continue to benefit from PT in order to decrease back pain and increase tolerance to functional activity  Plan: Continue per plan of care

## 2018-12-17 ENCOUNTER — OFFICE VISIT (OUTPATIENT)
Dept: PHYSICAL THERAPY | Facility: MEDICAL CENTER | Age: 38
End: 2018-12-17
Payer: OTHER MISCELLANEOUS

## 2018-12-17 DIAGNOSIS — G89.29 CHRONIC THORACIC BACK PAIN, UNSPECIFIED BACK PAIN LATERALITY: ICD-10-CM

## 2018-12-17 DIAGNOSIS — M54.6 CHRONIC THORACIC BACK PAIN, UNSPECIFIED BACK PAIN LATERALITY: ICD-10-CM

## 2018-12-17 DIAGNOSIS — M54.6 ACUTE MIDLINE THORACIC BACK PAIN: ICD-10-CM

## 2018-12-17 DIAGNOSIS — M54.50 ACUTE BILATERAL LOW BACK PAIN WITHOUT SCIATICA: Primary | ICD-10-CM

## 2018-12-17 PROCEDURE — 97110 THERAPEUTIC EXERCISES: CPT | Performed by: PHYSICAL THERAPIST

## 2018-12-17 PROCEDURE — 97014 ELECTRIC STIMULATION THERAPY: CPT | Performed by: PHYSICAL THERAPIST

## 2018-12-17 PROCEDURE — 97140 MANUAL THERAPY 1/> REGIONS: CPT | Performed by: PHYSICAL THERAPIST

## 2018-12-17 NOTE — PROGRESS NOTES
Daily Note     Today's date: 2018  Patient name: Livia Boucher  : 1980  MRN: [id number]  Referring provider: Radha Sharma MD  Dx:   Encounter Diagnosis     ICD-10-CM    1  Acute bilateral low back pain without sciatica M54 5    2  Acute midline thoracic back pain M54 6    3  Chronic thoracic back pain, unspecified back pain laterality M54 6     G89 29                   Subjective: Patient cont to report that overall his back pain is feeling better every week  Objective: See treatment diary below  Precautions: As Tolerated  Precautions: T11 compression Fx     Daily Treatment Diary      Manual              Lumbar STM x10'                                                                                                                  Exercise Diary              Ab bracing 5 sec x 20             DLS: [employer]  34 x 5 sec hold             Bridges with add iso  5" 2 x 10             DLS: [address] [place] [address] [place]  55N             Total gym squat   Level 17 2 x 10             Clam shells  25x GTB             tband rows with ab bracing   2x10 BTB             tband extensions with ab vracing   2x10 BTB NV            Pallof Press   2x10 BTB              Treadmill walk  x8'              Step up 6 in 2 x 10                      hip abd/extension GTB 2 x 10                                                                                                                                                                           Modalities              CP L/S 10' TK            Interferential stim 10'right lumbar spine                                          Assessment: Patient demonstrates good technique throughout program and denies any increased pain sx's post   Mod hypertonicity persists in R mid-thoracic ES musculature  Plan: Continue per plan of care

## 2018-12-20 ENCOUNTER — OFFICE VISIT (OUTPATIENT)
Dept: PHYSICAL THERAPY | Facility: MEDICAL CENTER | Age: 38
End: 2018-12-20
Payer: OTHER MISCELLANEOUS

## 2018-12-20 DIAGNOSIS — G89.29 CHRONIC THORACIC BACK PAIN, UNSPECIFIED BACK PAIN LATERALITY: ICD-10-CM

## 2018-12-20 DIAGNOSIS — M54.6 ACUTE MIDLINE THORACIC BACK PAIN: ICD-10-CM

## 2018-12-20 DIAGNOSIS — M54.50 ACUTE BILATERAL LOW BACK PAIN WITHOUT SCIATICA: Primary | ICD-10-CM

## 2018-12-20 DIAGNOSIS — M54.6 CHRONIC THORACIC BACK PAIN, UNSPECIFIED BACK PAIN LATERALITY: ICD-10-CM

## 2018-12-20 PROCEDURE — 97110 THERAPEUTIC EXERCISES: CPT | Performed by: PHYSICAL THERAPIST

## 2018-12-20 PROCEDURE — 97140 MANUAL THERAPY 1/> REGIONS: CPT | Performed by: PHYSICAL THERAPIST

## 2018-12-20 PROCEDURE — G8991 OTHER PT/OT GOAL STATUS: HCPCS | Performed by: PHYSICAL THERAPIST

## 2018-12-20 PROCEDURE — 97112 NEUROMUSCULAR REEDUCATION: CPT | Performed by: PHYSICAL THERAPIST

## 2018-12-20 PROCEDURE — G8990 OTHER PT/OT CURRENT STATUS: HCPCS | Performed by: PHYSICAL THERAPIST

## 2018-12-20 NOTE — PROGRESS NOTES
PT Re-Evaluation     Today's date: 2018  Patient name: William Scott  : 1980  MRN: [id number]  Referring provider: Grace Myles MD  Dx:   Encounter Diagnosis     ICD-10-CM    1  Acute bilateral low back pain without sciatica M54 5    2  Acute midline thoracic back pain M54 6    3  Chronic thoracic back pain, unspecified back pain laterality M54 6     G89 29                   Assessment  Assessment details: Patient has made progress towards short and long term goals since beginning physical therapy  Patient has decreased pain, increased range of motion and increased strength  Patient also able to perform functional activities such as better than initially  Patient will  benefit from continued physical therapy in order to continue to address impairments and to maximize function  Impairments: abnormal or restricted ROM, activity intolerance, lacks appropriate home exercise program and pain with function  Understanding of Dx/Px/POC: good   Prognosis: fair    Goals  Short term goals:  1) Patient will demonstrate decrease pain by 20-50% in 4 weeks  met  2) Patient will demonstrate no raheel's sign when returning from lumbar/trunk flexion  in 4 weeks met  3) Patient will demonstrate improved lumbar ROM by 25% in 4 weeks  met  4) Patient will demonstrate TA contraction of good in 4 week  Met   Long term goals:  1) Patient will demonstrate ability to ambulate > 2 hours without pain in 6 weeks  met  2) Patient will demonstrate ability to sit from > 1 hours without lumbar pain in 6 weeks  met  3) Patient will demonstrate ability to carry laundry without lumbar pain in 6 weeks  Part met  4) Patient will demonstrate independence with HEP in  6 weeks   met          Plan  Patient would benefit from: skilled physical therapy  Referral necessary: No  Planned modality interventions: electrical stimulation/Russian stimulation, TENS, thermotherapy: hydrocollator packs, cryotherapy and unattended electrical stimulation  Planned therapy interventions: joint mobilization, manual therapy, massage, motor coordination training, muscle pump exercises, neuromuscular re-education, patient education, postural training, strengthening, stretching, therapeutic activities, therapeutic exercise, therapeutic training, graded motor, graded exercise, graded activity, gait training, functional ROM exercises, flexibility, activity modification, abdominal trunk stabilization and body mechanics training  Frequency: 2x week  Duration in weeks: 6  Treatment plan discussed with: patient        Subjective Evaluation    History of Present Illness  Mechanism of injury: Patient reports overall improvement since beginning physical therapy  Patient notes movement, strength and everyday activities have all improved  Pain  Current pain ratin  At best pain ratin  At worst pain rating: 3  Quality: dull ache  Aggravating factors: lifting  Progression: improved    Social Support  Lives in: multiple-level home  Lives with: spouse and young children    Employment status: working (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)  Exercise history: Patient 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[employer], carrying laundry down stairs  Patient reports that he likes to ride his motor cycle          Objective     Special Questions  Positive for history of trauma  Negative for bladder dysfunction, bowel dysfunction, saddle (S4) numbness and history of cancer    Additional Special Questions  Patient denies unexplained weight loss  Static Posture     Comments  Increase paraspinal muscle tone    Palpation   Left   No palpable tenderness to the lumbar paraspinals  Right   No palpable tenderness to the lumbar paraspinals and quadratus lumborum  Tenderness of the lumbar paraspinals       Active Range of Motion     Lumbar   Flexion: Active lumbar flexion: 75% pain coming back up, raheel's sign:    Extension: Active lumbar extension: 25% Pain    Left lateral flexion: Active left lumbar lateral flexion: 75% NE  Right lateral flexion: Active right lumbar lateral flexion: 50% pain on right side    Left rotation: Active left lumbar rotation: 75% NE  Right rotation: Active right lumbar rotation: 50%      Additional Active Range of Motion Details  (-) raheel's sign returning from flexion  (-) Prone instability test L3  SLR (-)    Tests     Lumbar     Left   Negative crossed SLR and passive SLR  Right   Negative crossed SLR and passive SLR  Additional Tests Details  Spring testing L5- decrease pain  Spring testing L4- decrease pain  Spring testing L3- slight pain  Spring test L2- NE  Spring test L1- NE    Ambulation     Comments   Decreased lumbar rotation, decreased arm swing - but patient believes he is walking how he normally would        Flowsheet Rows      Most Recent Value   PT/OT G-Codes   Current Score  58   Projected Score  66   FOTO information reviewed  N/A   Assessment Type  Re-evaluation   G code set  Other PT/OT Primary   Other PT Primary Current Status ()  CK   Other PT Primary Goal Status ()  CJ        Precautions: T11 compression Fx     Daily Treatment Diary      Manual  12/17 12/20                   Lumbar STM x10'  10'                                                                                                                         Exercise Diary  12/17 12/20                   Ab bracing 5 sec x 20  5' 20x                    DLS: Ball Press  11 x 5 sec hold  5" 15x                    Bridges with add iso  5" 2 x 10  5" 2x10                    DLS: Marche  88H  87P                    Total gym squat   Level 17 2 x 10  lvl 17 2x10                    Clam shells  25x GTB  GTB 25x                    tband rows with ab bracing   2x10 BTB  BTB 2x10                    tband extensions with ab vracing   2x10 BTB BTB 2x10                    Pallof Press   2x10 BTB  BTB 2x10                    Treadmill walk  x8'  8'                    Step up 6 in 2 x 10  6" 2x10                    hip abd/extension GTB 2 x 10  GTB 2x10                                                                                                                                                                         Modalities  12/17                     CP L/S 10' TK                     Interferential stim 10'right lumbar spine

## 2018-12-26 ENCOUNTER — OFFICE VISIT (OUTPATIENT)
Dept: PHYSICAL THERAPY | Facility: MEDICAL CENTER | Age: 38
End: 2018-12-26
Payer: OTHER MISCELLANEOUS

## 2018-12-26 DIAGNOSIS — M54.6 ACUTE MIDLINE THORACIC BACK PAIN: ICD-10-CM

## 2018-12-26 DIAGNOSIS — M54.6 CHRONIC THORACIC BACK PAIN, UNSPECIFIED BACK PAIN LATERALITY: ICD-10-CM

## 2018-12-26 DIAGNOSIS — G89.29 CHRONIC THORACIC BACK PAIN, UNSPECIFIED BACK PAIN LATERALITY: ICD-10-CM

## 2018-12-26 DIAGNOSIS — M54.50 ACUTE BILATERAL LOW BACK PAIN WITHOUT SCIATICA: Primary | ICD-10-CM

## 2018-12-26 PROCEDURE — 97112 NEUROMUSCULAR REEDUCATION: CPT

## 2018-12-26 PROCEDURE — 97140 MANUAL THERAPY 1/> REGIONS: CPT

## 2018-12-26 PROCEDURE — G8992 OTHER PT/OT  D/C STATUS: HCPCS

## 2018-12-26 PROCEDURE — 97110 THERAPEUTIC EXERCISES: CPT

## 2018-12-26 PROCEDURE — G8991 OTHER PT/OT GOAL STATUS: HCPCS

## 2018-12-26 NOTE — PROGRESS NOTES
Daily Note     Today's date: 2018  Patient name: Rohan Ricks  : 1980  MRN: [id number]  Referring provider: Mireya Solano MD  Dx:   Encounter Diagnosis     ICD-10-CM    1  Acute bilateral low back pain without sciatica M54 5    2  Acute midline thoracic back pain M54 6    3  Chronic thoracic back pain, unspecified back pain laterality M54 6     G89 29               8:00- 8:48 1:1 CF     Subjective: Pt states his back has been feeling pretty good today  Pt states nothing seems to bother him much any more  Pt states his exercises at home have seem to be helping       Objective: See treatment diary below    Precautions: T11 compression Fx     Daily Treatment Diary      Manual                   Lumbar STM x10'  10'  CF                                                                                                                       Exercise Diary                   Ab bracing 5 sec x 20  5' 20x  5" x 20                   DLS: Ball Press  51 x 5 sec hold  5" 15x 5" x 20                   Bridges with add iso  5" 2 x 10  5" 2x10 5" x 20                   DLS: Nasir  69C  81P 10x                  Total gym squat   Level 17 2 x 10  lvl 17 2x10 lvl 17   2 x10                   Clam shells  25x GTB  GTB 25x GTB   25 x                  tband rows with ab bracing   2x10 BTB  BTB 2x10  BTB 2 x10                   tband extensions with ab vracing   2x10 BTB BTB 2x10 BTB  2 x10                   Pallof Press   2x10 BTB  BTB 2x10  BTB 2 x10                   Treadmill walk  x8'  8' 8 mins                   Step up 6 in 2 x 10  6" 2x10 6" x 20                   hip abd/extension GTB 2 x 10  GTB 2x10  --                                                                                                                                                                       Modalities                     CP L/S 10' TK  --                   Interferential stim 10'right lumbar spine --                                                 Assessment: Tolerated treatment well with no increase of pain at end of session  Pt demonstrates minimal soft tissue restriction seen with manual therapy in his lumbar paraspinals  Pt demonstrates good form with exercises and good core contraction through out session  Pt required minimal verbal cuing for corrections  Patient would benefit from continued PT      Plan: Pt confident with HEP and ready for discharge  Discharge to [facility] at this time

## 2018-12-26 NOTE — PROGRESS NOTES
PT Re-Evaluation     Today's date: 2018  Patient name: Miriam Palmer  : 1980  MRN: [id number]  Referring provider: Nereida Durand MD  Dx:   No diagnosis found  Assessment  Assessment details: Patient has made progress towards short and long term goals since beginning physical therapy  Patient has decreased pain, increased range of motion and increased strength  Patient also able to perform functional activities such as better than initially  Patient will  benefit from continued physical therapy in order to continue to address impairments and to maximize function  Impairments: abnormal or restricted ROM, activity intolerance, lacks appropriate home exercise program and pain with function  Understanding of Dx/Px/POC: good   Prognosis: fair    Goals  Short term goals:  1) Patient will demonstrate decrease pain by 20-50% in 4 weeks  met  2) Patient will demonstrate no raheel's sign when returning from lumbar/trunk flexion  in 4 weeks met  3) Patient will demonstrate improved lumbar ROM by 25% in 4 weeks  met  4) Patient will demonstrate TA contraction of good in 4 week  Met   Long term goals:  1) Patient will demonstrate ability to ambulate > 2 hours without pain in 6 weeks  met  2) Patient will demonstrate ability to sit from > 1 hours without lumbar pain in 6 weeks  met  3) Patient will demonstrate ability to carry laundry without lumbar pain in 6 weeks  Part met  4) Patient will demonstrate independence with HEP in  6 weeks   met          Plan  Patient would benefit from: skilled physical therapy  Referral necessary: No  Planned modality interventions: electrical stimulation/Russian stimulation, TENS, thermotherapy: hydrocollator packs, cryotherapy and unattended electrical stimulation  Planned therapy interventions: joint mobilization, manual therapy, massage, motor coordination training, muscle pump exercises, neuromuscular re-education, patient education, postural training, strengthening, stretching, therapeutic activities, therapeutic exercise, therapeutic training, graded motor, graded exercise, graded activity, gait training, functional ROM exercises, flexibility, activity modification, abdominal trunk stabilization and body mechanics training  Frequency: 2x week  Duration in weeks: 6  Treatment plan discussed with: patient        Subjective Evaluation    History of Present Illness  Mechanism of injury: Patient reports overall improvement since beginning physical therapy  Patient notes movement, strength and everyday activities have all improved  Pain  Current pain ratin  At best pain ratin  At worst pain rating: 3  Quality: dull ache  Aggravating factors: lifting  Progression: improved    Social Support  Lives in: multiple-level home  Lives with: spouse and young children    Employment status: working (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)  Exercise history: Patient 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[employer], carrying laundry down stairs  Patient reports that he likes to ride his motor cycle          Objective     Special Questions  Positive for history of trauma  Negative for bladder dysfunction, bowel dysfunction, saddle (S4) numbness and history of cancer    Additional Special Questions  Patient denies unexplained weight loss  Static Posture     Comments  Increase paraspinal muscle tone    Palpation   Left   No palpable tenderness to the lumbar paraspinals  Right   No palpable tenderness to the lumbar paraspinals and quadratus lumborum  Tenderness of the lumbar paraspinals  Active Range of Motion     Lumbar   Flexion: Active lumbar flexion: 75% pain coming back up, raheel's sign:    Extension: Active lumbar extension: 25% Pain    Left lateral flexion: Active left lumbar lateral flexion: 75% NE  Right lateral flexion: Active right lumbar lateral flexion: 50% pain on right side    Left rotation: Active left lumbar rotation: 75% NE     Right rotation: Active right lumbar rotation: 50%      Additional Active Range of Motion Details  (-) raheel's sign returning from flexion  (-) Prone instability test L3  SLR (-)    Tests     Lumbar     Left   Negative crossed SLR and passive SLR  Right   Negative crossed SLR and passive SLR  Additional Tests Details  Spring testing L5- decrease pain  Spring testing L4- decrease pain  Spring testing L3- slight pain  Spring test L2- NE  Spring test L1- NE    Ambulation     Comments   Decreased lumbar rotation, decreased arm swing - but patient believes he is walking how he normally would          Precautions: T11 compression Fx     Daily Treatment Diary      Manual  12/17 12/20                   Lumbar STM x10'  10'                                                                                                                         Exercise Diary  12/17 12/20                   Ab bracing 5 sec x 20  5' 20x                    DLS: Ball Press  26 x 5 sec hold  5" 15x                    Bridges with add iso  5" 2 x 10  5" 2x10                    DLS: [employer]  63R  21D                    Total gym squat   Level 17 2 x 10  lvl 17 2x10                    Clam shells  25x GTB  GTB 25x                    tband rows with ab bracing   2x10 BTB  BTB 2x10                    tband extensions with ab vracing   2x10 BTB BTB 2x10                    Pallof Press   2x10 BTB  BTB 2x10                    Treadmill walk  x8'  8'                    Step up 6 in 2 x 10  6" 2x10                    hip abd/extension GTB 2 x 10  GTB 2x10                                                                                                                                                                         Modalities  12/17                     CP L/S 10' TK                     Interferential stim 10'right lumbar spine

## 2018-12-28 ENCOUNTER — APPOINTMENT (OUTPATIENT)
Dept: PHYSICAL THERAPY | Facility: MEDICAL CENTER | Age: 38
End: 2018-12-28
Payer: OTHER MISCELLANEOUS

## 2018-12-31 ENCOUNTER — APPOINTMENT (OUTPATIENT)
Dept: PHYSICAL THERAPY | Facility: MEDICAL CENTER | Age: 38
End: 2018-12-31
Payer: OTHER MISCELLANEOUS

## 2019-01-16 ENCOUNTER — OFFICE VISIT (OUTPATIENT)
Dept: PAIN MEDICINE | Facility: CLINIC | Age: 39
End: 2019-01-16
Payer: OTHER MISCELLANEOUS

## 2019-01-16 VITALS
HEIGHT: 70 IN | SYSTOLIC BLOOD PRESSURE: 124 MMHG | TEMPERATURE: 98.1 F | RESPIRATION RATE: 18 BRPM | DIASTOLIC BLOOD PRESSURE: 86 MMHG | WEIGHT: 199 LBS | BODY MASS INDEX: 28.49 KG/M2

## 2019-01-16 DIAGNOSIS — S22.000S CLOSED COMPRESSION FRACTURE OF THORACIC VERTEBRA, SEQUELA: ICD-10-CM

## 2019-01-16 DIAGNOSIS — G89.29 CHRONIC THORACIC BACK PAIN, UNSPECIFIED BACK PAIN LATERALITY: Primary | ICD-10-CM

## 2019-01-16 DIAGNOSIS — M54.6 CHRONIC THORACIC BACK PAIN, UNSPECIFIED BACK PAIN LATERALITY: Primary | ICD-10-CM

## 2019-01-16 DIAGNOSIS — M79.18 MYOFASCIAL PAIN SYNDROME: ICD-10-CM

## 2019-01-16 PROBLEM — S22.000A CLOSED COMPRESSION FRACTURE OF THORACIC VERTEBRA (HCC): Status: ACTIVE | Noted: 2019-01-16

## 2019-01-16 PROCEDURE — 99212 OFFICE O/P EST SF 10 MIN: CPT | Performed by: ANESTHESIOLOGY

## 2019-01-16 NOTE — LETTER
January 16, 2019     Patient: Breann Crowe   YOB: 1980   Date of Visit: 1/16/2019       To Whom it May Concern:    Breann Crowe is under my professional care  He was seen in my office on 1/16/2019  He may return to work on 01/17/2019 without restrictions       If you have any questions or concerns, please don't hesitate to call           Sincerely,          Marguerite Candelaria MD        CC: No Recipients
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
